# Patient Record
Sex: MALE | URBAN - METROPOLITAN AREA
[De-identification: names, ages, dates, MRNs, and addresses within clinical notes are randomized per-mention and may not be internally consistent; named-entity substitution may affect disease eponyms.]

---

## 2022-06-28 ENCOUNTER — TELEPHONE (OUTPATIENT)
Dept: NEUROSURGERY | Facility: CLINIC | Age: 62
End: 2022-06-28
Payer: COMMERCIAL
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
## 2022-06-30 DIAGNOSIS — M81.0 AGE-RELATED OSTEOPOROSIS WITHOUT CURRENT PATHOLOGICAL FRACTURE: ICD-10-CM

## 2022-06-30 DIAGNOSIS — M47.812 CERVICAL SPONDYLOSIS: Primary | ICD-10-CM

## 2022-06-30 NOTE — TELEPHONE ENCOUNTER
I've entered a response to the patient's message below.       Okay to schedule. Rx for:     Xrays, DEXA

## 2022-08-16 ENCOUNTER — OFFICE VISIT (OUTPATIENT)
Dept: NEUROSURGERY | Facility: CLINIC | Age: 62
End: 2022-08-16
Payer: COMMERCIAL

## 2022-08-16 VITALS
HEIGHT: 70 IN | BODY MASS INDEX: 23.91 KG/M2 | RESPIRATION RATE: 16 BRPM | WEIGHT: 167 LBS | OXYGEN SATURATION: 96 % | TEMPERATURE: 97.3 F | SYSTOLIC BLOOD PRESSURE: 144 MMHG | DIASTOLIC BLOOD PRESSURE: 78 MMHG | HEART RATE: 67 BPM

## 2022-08-16 DIAGNOSIS — M47.12 CERVICAL SPONDYLOSIS WITH MYELOPATHY: Primary | ICD-10-CM

## 2022-08-16 PROCEDURE — 3008F BODY MASS INDEX DOCD: CPT | Performed by: NEUROLOGICAL SURGERY

## 2022-08-16 PROCEDURE — 99204 OFFICE O/P NEW MOD 45 MIN: CPT | Performed by: NEUROLOGICAL SURGERY
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
## 2023-08-03 ENCOUNTER — TELEPHONE (OUTPATIENT)
Dept: NEUROSURGERY | Facility: CLINIC | Age: 63
End: 2023-08-03
Payer: COMMERCIAL

## 2023-08-03 NOTE — TELEPHONE ENCOUNTER
Patient called because he is having L leg numbness. He was previously seen for cervical. No updated imaging.    He said in the past, he was treated for a herniated disc at L3-4 with injections. He said he's very active outside and the symptoms come and go.    He said its in his L hip, into his hamstring and L leg/foot. He does have a limp when walking.

## 2023-08-04 NOTE — TELEPHONE ENCOUNTER
Patient will start with PCP for evaluation and follow up with us once MRI obtained. His symptoms have been present for 1 month without injury or event. When standing in certain positions, he will get numbness down his left leg which lasts about 30 minutes before it spontaneously resolves. No weakness, saddle anesthesia, or bowel/bladder changes.

## 2023-08-28 ENCOUNTER — TRANSCRIBE ORDERS (OUTPATIENT)
Dept: SCHEDULING | Age: 63
End: 2023-08-28

## 2023-08-28 DIAGNOSIS — M54.16 RADICULOPATHY, LUMBAR REGION: Primary | ICD-10-CM

## 2023-11-10 ENCOUNTER — OFFICE VISIT (OUTPATIENT)
Dept: NEUROSURGERY | Facility: CLINIC | Age: 63
End: 2023-11-10
Payer: COMMERCIAL

## 2023-11-10 VITALS
DIASTOLIC BLOOD PRESSURE: 76 MMHG | OXYGEN SATURATION: 99 % | BODY MASS INDEX: 23.82 KG/M2 | TEMPERATURE: 97 F | HEIGHT: 70 IN | SYSTOLIC BLOOD PRESSURE: 122 MMHG | WEIGHT: 166.4 LBS | RESPIRATION RATE: 16 BRPM | HEART RATE: 70 BPM

## 2023-11-10 DIAGNOSIS — M48.062 LUMBAR STENOSIS WITH NEUROGENIC CLAUDICATION: Primary | ICD-10-CM

## 2023-11-10 DIAGNOSIS — M47.12 CERVICAL SPONDYLOSIS WITH MYELOPATHY: ICD-10-CM

## 2023-11-10 PROCEDURE — 3008F BODY MASS INDEX DOCD: CPT | Performed by: NEUROLOGICAL SURGERY

## 2023-11-10 PROCEDURE — 99213 OFFICE O/P EST LOW 20 MIN: CPT | Performed by: NEUROLOGICAL SURGERY

## 2023-11-10 NOTE — PROGRESS NOTES
Dear Dr. Leblanc,    Mr. Gutierrez returned to the office for follow-up.  Initially evaluated him on 8/16/2022 and the details of my initial assessment and plan are documented in that note.  He basely had a mild central cord syndrome that has resolved.  He has no current symptoms to suggest myelopathy or cervical radiculopathy and only has mild neck pain.  All the symptoms are manageable.  However, over the past few months he has had bilateral lower extremity symptoms.  He will have mild pain in the right L3 distribution to the thigh and knee also with occasional numbness and tingling.  And he has numbness and tingling on the left side in the L4-L5 and even some in the S1 distribution.  He is neurologically nonfocal on examination at this time without numbness or weakness.  He is ambulating well.  His symptoms do have imaging correlates.  On the right side at L2-3 he has a disc bulge which does contact the L3 nerve root explaining his right thigh and knee pain and numbness.  He then has left-sided facet arthropathy and ligamentous hypertrophy at L3-4 and L4-5 causing lateral recess stenosis compressing the traversing L4 nerve roots at the L3-4 level and the left side of the cauda equina including multiple nerve roots at the L4-5 level likely explaining his L5 and S1 symptoms.  Again his symptoms are manageable at this time and he does not wish to proceed with surgery.  He could consider injections, neuropathic pain medications if the radicular pain component predominates but I would not favor that for intermittent numbness.  Any surgical intervention would like to take the form of right L2-3 hemilaminotomy and microdiscectomy and left L3-4 and L4-5 hemilaminotomies and medial facetectomies.  He is not ready for surgery at this time.  All questions were answered.  I spent 20 minutes on this date of service performing the following activities: Preparing for the visit, obtaining/reviewing records, independently reviewing  studies, obtaining history/performing examination, counseling and education, ordering tests, medications and studies, communicating results, coordinating care and documentation.    Thank you very much for the opportunity to be involved in the care of your patient.  Please call the office should any questions or problems arise.    Sincerely,       Adi Pelletier MD

## 2023-11-10 NOTE — LETTER
November 10, 2023     Timothy Leblanc MD  [address] [facility] [address]    Patient: Abhilash uGtierrez  YOB: 1960  Date of Visit: 11/10/2023      Dear Dr. Leblanc:    Thank you for referring Abhilash Gutierrez to me for evaluation. Below are my notes for this consultation.    If you have questions, please do not hesitate to call me. I look forward to following your patient along with you.         Sincerely,        Adi Pelletier MD        CC: No Recipients    Adi Pelletier MD  11/10/2023  5:00 PM  Signed  Dear Dr. Leblanc,    Mr. Gutierrez returned to the office for follow-up.  Initially evaluated him on 8/16/2022 and the details of my initial assessment and plan are documented in that note.  He basely had a mild central cord syndrome that has resolved.  He has no current symptoms to suggest myelopathy or cervical radiculopathy and only has mild neck pain.  All the symptoms are manageable.  However, over the past few months he has had bilateral lower extremity symptoms.  He will have mild pain in the right L3 distribution to the thigh and knee also with occasional numbness and tingling.  And he has numbness and tingling on the left side in the L4-L5 and even some in the S1 distribution.  He is neurologically nonfocal on examination at this time without numbness or weakness.  He is ambulating well.  His symptoms do have imaging correlates.  On the right side at L2-3 he has a disc bulge which does contact the L3 nerve root explaining his right thigh and knee pain and numbness.  He then has left-sided facet arthropathy and ligamentous hypertrophy at L3-4 and L4-5 causing lateral recess stenosis compressing the traversing L4 nerve roots at the L3-4 level and the left side of the cauda equina including multiple nerve roots at the L4-5 level likely explaining his L5 and S1 symptoms.  Again his symptoms are manageable at this time and he does not wish to proceed with surgery.  He could consider  injections, neuropathic pain medications if the radicular pain component predominates but I would not favor that for intermittent numbness.  Any surgical intervention would like to take the form of right L2-3 hemilaminotomy and microdiscectomy and left L3-4 and L4-5 hemilaminotomies and medial facetectomies.  He is not ready for surgery at this time.  All questions were answered.  I spent 20 minutes on this date of service performing the following activities: Preparing for the visit, obtaining/reviewing records, independently reviewing studies, obtaining history/performing examination, counseling and education, ordering tests, medications and studies, communicating results, coordinating care and documentation.    Thank you very much for the opportunity to be involved in the care of your patient.  Please call the office should any questions or problems arise.    Sincerely,       Adi Pelletier MD

## 2024-07-04 RX ORDER — PREDNISONE 20 MG/1
TABLET ORAL
Qty: 30 TABLET | Refills: 1 | Status: SHIPPED | OUTPATIENT
Start: 2024-07-04 | End: 2024-08-23 | Stop reason: SDUPTHER

## 2024-08-23 RX ORDER — PREDNISONE 20 MG/1
TABLET ORAL
Qty: 30 TABLET | Refills: 1 | Status: SHIPPED | OUTPATIENT
Start: 2024-08-23 | End: 2024-12-20 | Stop reason: SDUPTHER

## 2024-09-13 DIAGNOSIS — M48.062 LUMBAR STENOSIS WITH NEUROGENIC CLAUDICATION: Primary | ICD-10-CM

## 2024-09-13 DIAGNOSIS — M47.12 CERVICAL SPONDYLOSIS WITH MYELOPATHY: ICD-10-CM

## 2024-11-18 ENCOUNTER — TELEPHONE (OUTPATIENT)
Dept: GASTROENTEROLOGY | Facility: CLINIC | Age: 64
End: 2024-11-18

## 2024-11-18 ENCOUNTER — PREP FOR PROCEDURE (OUTPATIENT)
Dept: GASTROENTEROLOGY | Facility: CLINIC | Age: 64
End: 2024-11-18

## 2024-11-18 DIAGNOSIS — Z12.11 SCREENING FOR COLON CANCER: Primary | ICD-10-CM

## 2024-11-18 NOTE — TELEPHONE ENCOUNTER
Scheduled date of colonoscopy (as of today): 12/17/24  Physician performing colonoscopy: Dr. Stafford  Location of colonoscopy: [facility]  Bowel prep reviewed with patient: Miralax  Instructions reviewed with patient by: Nicole Cruz  Clearances: none

## 2024-11-18 NOTE — TELEPHONE ENCOUNTER
11/18/24  Screened by: Nicole Cruz    Referring Provider Dr. Merrill    Pre- Screening:     There is no height or weight on file to calculate BMI.  Has patient been referred for a routine screening Colonoscopy? yes  Is the patient between 45-75 years old? yes      Previous Colonoscopy yes   If yes:    Date: 2013    Facility:[facility]    Reason: screening      SCHEDULING STAFF: If the patient is between 45yrs-49yrs, please advise patient to confirm benefits/coverage with their insurance company for a routine screening colonoscopy, some insurance carriers will only cover at 50yrs or older. If the patient is over 75years old, please schedule an office visit.     Does the patient want to see a Gastroenterologist prior to their procedure OR are they having any GI symptoms? no    Has the patient been hospitalized or had abdominal surgery in the past 6 months? no    Does the patient use supplemental oxygen? no    Does the patient take Coumadin, Lovenox, Plavix, Elliquis, Xarelto, or other blood thinning medication? no    Has the patient had a stroke, cardiac event, or stent placed in the past year? non      SCHEDULING STAFF: If patient answers NO to above questions, then schedule procedure. If patient answers YES to above questions, then schedule office appointment.     If patient is between 45yrs - 49yrs, please advise patient that we will have to confirm benefits & coverage with their insurance company for a routine screening colonoscopy.

## 2024-12-02 ENCOUNTER — TELEPHONE (OUTPATIENT)
Dept: GASTROENTEROLOGY | Facility: CLINIC | Age: 64
End: 2024-12-02

## 2024-12-03 ENCOUNTER — ANESTHESIA (OUTPATIENT)
Dept: ANESTHESIOLOGY | Facility: AMBULATORY SURGERY CENTER | Age: 64
End: 2024-12-03

## 2024-12-03 ENCOUNTER — ANESTHESIA EVENT (OUTPATIENT)
Dept: ANESTHESIOLOGY | Facility: AMBULATORY SURGERY CENTER | Age: 64
End: 2024-12-03

## 2024-12-17 ENCOUNTER — ANESTHESIA (OUTPATIENT)
Dept: GASTROENTEROLOGY | Facility: AMBULATORY SURGERY CENTER | Age: 64
End: 2024-12-17

## 2024-12-17 ENCOUNTER — ANESTHESIA EVENT (OUTPATIENT)
Dept: GASTROENTEROLOGY | Facility: AMBULATORY SURGERY CENTER | Age: 64
End: 2024-12-17

## 2024-12-17 ENCOUNTER — HOSPITAL ENCOUNTER (OUTPATIENT)
Dept: GASTROENTEROLOGY | Facility: AMBULATORY SURGERY CENTER | Age: 64
Discharge: HOME | End: 2024-12-17
Payer: COMMERCIAL

## 2024-12-17 VITALS
BODY MASS INDEX: 23.62 KG/M2 | WEIGHT: 165 LBS | SYSTOLIC BLOOD PRESSURE: 104 MMHG | RESPIRATION RATE: 19 BRPM | DIASTOLIC BLOOD PRESSURE: 69 MMHG | OXYGEN SATURATION: 97 % | TEMPERATURE: 97.2 F | HEIGHT: 70 IN | HEART RATE: 84 BPM

## 2024-12-17 DIAGNOSIS — Z12.11 SCREENING FOR COLON CANCER: ICD-10-CM

## 2024-12-17 PROCEDURE — 45380 COLONOSCOPY AND BIOPSY: CPT | Performed by: INTERNAL MEDICINE

## 2024-12-17 PROCEDURE — 88305 TISSUE EXAM BY PATHOLOGIST: CPT | Performed by: PATHOLOGY

## 2024-12-17 RX ORDER — SODIUM CHLORIDE, SODIUM LACTATE, POTASSIUM CHLORIDE, CALCIUM CHLORIDE 600; 310; 30; 20 MG/100ML; MG/100ML; MG/100ML; MG/100ML
INJECTION, SOLUTION INTRAVENOUS CONTINUOUS PRN
Status: DISCONTINUED | OUTPATIENT
Start: 2024-12-17 | End: 2024-12-17

## 2024-12-17 RX ORDER — EPHEDRINE SULFATE 50 MG/ML
INJECTION INTRAVENOUS AS NEEDED
Status: DISCONTINUED | OUTPATIENT
Start: 2024-12-17 | End: 2024-12-17

## 2024-12-17 RX ORDER — SODIUM CHLORIDE, SODIUM LACTATE, POTASSIUM CHLORIDE, CALCIUM CHLORIDE 600; 310; 30; 20 MG/100ML; MG/100ML; MG/100ML; MG/100ML
50 INJECTION, SOLUTION INTRAVENOUS CONTINUOUS
Status: DISCONTINUED | OUTPATIENT
Start: 2024-12-17 | End: 2024-12-21 | Stop reason: HOSPADM

## 2024-12-17 RX ORDER — PROPOFOL 10 MG/ML
INJECTION, EMULSION INTRAVENOUS AS NEEDED
Status: DISCONTINUED | OUTPATIENT
Start: 2024-12-17 | End: 2024-12-17

## 2024-12-17 RX ADMIN — SODIUM CHLORIDE, SODIUM LACTATE, POTASSIUM CHLORIDE, CALCIUM CHLORIDE: 600; 310; 30; 20 INJECTION, SOLUTION INTRAVENOUS at 11:33

## 2024-12-17 RX ADMIN — PROPOFOL 150 MG: 10 INJECTION, EMULSION INTRAVENOUS at 11:37

## 2024-12-17 RX ADMIN — PROPOFOL 50 MG: 10 INJECTION, EMULSION INTRAVENOUS at 11:38

## 2024-12-17 RX ADMIN — PROPOFOL 50 MG: 10 INJECTION, EMULSION INTRAVENOUS at 11:40

## 2024-12-17 RX ADMIN — EPHEDRINE SULFATE 10 MG: 50 INJECTION INTRAVENOUS at 11:49

## 2024-12-17 NOTE — H&P
"History and Physical - [facility]  Alan Kraus 64 y.o. male MRN: [id number]    HPI: Alan Kraus is a 64 y.o. year old male who presents for colorectal cancer screening.  He is average risk    REVIEW OF SYSTEMS: Per the HPI, and otherwise unremarkable.    Historical Information   Past Medical History:   Diagnosis Date    Allergic rhinitis     History of testicular cancer 1989    radiation tx , recurrence 1993    Spinal stenosis      Past Surgical History:   Procedure Laterality Date    COLONOSCOPY       Social History   Social History     Substance and Sexual Activity   Alcohol Use Not Currently     Social History     Substance and Sexual Activity   Drug Use Not Currently     Social History     Tobacco Use   Smoking Status Never   Smokeless Tobacco Never     Family History   Problem Relation Age of Onset    Colon polyps Neg Hx     Colonic polyp Neg Hx        Meds/Allergies       Current Outpatient Medications:     cefuroxime (CEFTIN) 500 mg tablet    fexofenadine (ALLEGRA) 60 MG tablet    fluticasone (FLONASE) 50 mcg/act nasal spray    Current Facility-Administered Medications:     lactated ringers infusion, 50 mL/hr, Intravenous, Continuous    No Known Allergies    Objective     /76   Pulse 62   Temp (!) 97.2 °F (36.2 °C) (Temporal)   Resp 17   Ht 5' 10\" (1.778 m)   Wt 74.8 kg (165 lb)   SpO2 98%   BMI 23.68 kg/m²     PHYSICAL EXAM    Gen: NAD AAOx3  Head: Normocephalic, Atraumatic  CV: S1S2 RRR no m/r/g  CHEST: Clear b/l no c/r/w  ABD: soft, +BS NT/ND  EXT: no edema    ASSESSMENT/PLAN:  This is a 64 y.o. year old male here for screening colonoscopy, and he is stable and optimized for his procedure.        "

## 2024-12-17 NOTE — ANESTHESIA POSTPROCEDURE EVALUATION
Post-Op Assessment Note    CV Status:  Stable  Pain Score: 0    Pain management: adequate       Mental Status:  Alert and awake   Hydration Status:  Euvolemic and stable   PONV Controlled:  Controlled   Airway Patency:  Patent and adequate     Post Op Vitals Reviewed: Yes    No anethesia notable event occurred.    Staff: CRNA           Last Filed PACU Vitals:  Vitals Value Taken Time   Temp     Pulse     BP     Resp     SpO2         Modified Temi:  Activity: 2 (12/17/2024 10:33 AM)  Respiration: 2 (12/17/2024 10:33 AM)  Circulation: 2 (12/17/2024 10:33 AM)  Consciousness: 2 (12/17/2024 10:33 AM)  Oxygen Saturation: 2 (12/17/2024 10:33 AM)  Modified Temi Score: 10 (12/17/2024 10:33 AM)

## 2024-12-17 NOTE — ANESTHESIA PREPROCEDURE EVALUATION
Procedure:  COLONOSCOPY    Relevant Problems   ANESTHESIA (within normal limits)        Physical Exam    Airway    Mallampati score: I  TM Distance: >3 FB  Neck ROM: full     Dental       Cardiovascular  Cardiovascular exam normal    Pulmonary  Pulmonary exam normal     Other Findings        Anesthesia Plan  ASA Score- 1     Anesthesia Type- IV sedation with anesthesia with ASA Monitors.         Additional Monitors:     Airway Plan:     Comment: I discussed risks (reviewed with patient on the anesthesia consent form), benefits and alternatives of monitored sedation including the possibility under sedation to have recall or mild discomfort.  .       Plan Factors-    Chart reviewed.    Patient summary reviewed.                  Induction- intravenous.    Postoperative Plan-         Informed Consent- Anesthetic plan and risks discussed with patient.  I personally reviewed this patient with the CRNA. Discussed and agreed on the Anesthesia Plan with the CRNA..

## 2024-12-20 PROCEDURE — 88305 TISSUE EXAM BY PATHOLOGIST: CPT | Performed by: PATHOLOGY

## 2024-12-21 ENCOUNTER — RESULTS FOLLOW-UP (OUTPATIENT)
Dept: GASTROENTEROLOGY | Facility: CLINIC | Age: 64
End: 2024-12-21

## 2024-12-21 RX ORDER — PREDNISONE 20 MG/1
TABLET ORAL
Qty: 30 TABLET | Refills: 1 | Status: SHIPPED | OUTPATIENT
Start: 2024-12-21

## 2024-12-23 ENCOUNTER — TELEPHONE (OUTPATIENT)
Dept: GASTROENTEROLOGY | Facility: CLINIC | Age: 64
End: 2024-12-23

## 2024-12-24 NOTE — RESULT ENCOUNTER NOTE
I called the patient reviewed path.  He just had hyperplastic polyps.  No adenomas.  Recall for 10 years.  He is copy patient's PCP